# Patient Record
Sex: MALE | Race: WHITE | NOT HISPANIC OR LATINO | URBAN - METROPOLITAN AREA
[De-identification: names, ages, dates, MRNs, and addresses within clinical notes are randomized per-mention and may not be internally consistent; named-entity substitution may affect disease eponyms.]

---

## 2018-04-17 ENCOUNTER — EMERGENCY (EMERGENCY)
Facility: HOSPITAL | Age: 63
LOS: 1 days | Discharge: HOME | End: 2018-04-17
Attending: EMERGENCY MEDICINE | Admitting: EMERGENCY MEDICINE
Payer: COMMERCIAL

## 2018-04-17 VITALS
RESPIRATION RATE: 16 BRPM | WEIGHT: 179.9 LBS | OXYGEN SATURATION: 97 % | TEMPERATURE: 98 F | HEIGHT: 69 IN | SYSTOLIC BLOOD PRESSURE: 157 MMHG | HEART RATE: 84 BPM | DIASTOLIC BLOOD PRESSURE: 94 MMHG

## 2018-04-17 PROCEDURE — 99284 EMERGENCY DEPT VISIT MOD MDM: CPT

## 2018-04-17 PROCEDURE — 99284 EMERGENCY DEPT VISIT MOD MDM: CPT | Mod: 25

## 2018-04-17 PROCEDURE — 73562 X-RAY EXAM OF KNEE 3: CPT

## 2018-04-17 PROCEDURE — 73562 X-RAY EXAM OF KNEE 3: CPT | Mod: 26,RT

## 2018-04-17 RX ORDER — ATORVASTATIN CALCIUM 80 MG/1
1 TABLET, FILM COATED ORAL
Qty: 0 | Refills: 0 | COMMUNITY

## 2018-04-17 RX ORDER — IBUPROFEN 200 MG
600 TABLET ORAL ONCE
Qty: 0 | Refills: 0 | Status: COMPLETED | OUTPATIENT
Start: 2018-04-17 | End: 2018-04-17

## 2018-04-17 RX ORDER — MULTIVIT-MIN/FERROUS GLUCONATE 9 MG/15 ML
1 LIQUID (ML) ORAL
Qty: 0 | Refills: 0 | COMMUNITY

## 2018-04-17 RX ORDER — LOSARTAN/HYDROCHLOROTHIAZIDE 100MG-25MG
0 TABLET ORAL
Qty: 0 | Refills: 0 | COMMUNITY

## 2018-04-17 RX ORDER — ASPIRIN/CALCIUM CARB/MAGNESIUM 324 MG
1 TABLET ORAL
Qty: 0 | Refills: 0 | COMMUNITY

## 2018-04-17 RX ADMIN — Medication 600 MILLIGRAM(S): at 20:56

## 2018-04-17 NOTE — ED PROVIDER NOTE - PHYSICAL EXAMINATION
MS RLE:+ INFERIOR LATERAL THIGH TTP WITHOUT CREPITUS, DEFORMITY, SWELLING. FROM OF R LE. SENSATION GROSSLY INTACT.   PV:+ 2 DP BL LE.

## 2018-04-17 NOTE — ED PROCEDURE NOTE - CPROC ED POST PROC CARE GUIDE1
Verbal/written post procedure instructions were given to patient/caregiver./Instructed patient/caregiver regarding signs and symptoms of infection./Instructed patient/caregiver to follow-up with primary care physician./Elevate the injured extremity as instructed./Keep the cast/splint/dressing clean and dry.

## 2018-04-17 NOTE — CONSULT NOTE ADULT - SUBJECTIVE AND OBJECTIVE BOX
male slipped and fell and noted a sharp pain at the distal lateral thigh. denies any patella injury or any injury in any other area    PE: mild swelling dorsolateral right thigh. point tenderness at the junction of the vastus lateralis and quad tendon   strength right quad -5/5; H/S 5/5. full ROM of the knee actively  compartments completely soft  no instability of the knee with varus/valgus testing. negative ant drawer/lachman  no patella subluxation. no knee joint tenderness    xray: right knee with calcification lateral femoral condyle, no acute changes      Plan:   1) MRI right thigh to r/o quad muscle tear  2) HKB open  3) cane  4) RICE  5) antiinflammatory meds PRN   6) rest   7) follow in office 1 week   8) WBAT

## 2018-04-17 NOTE — ED ADULT NURSE NOTE - OBJECTIVE STATEMENT
patient has c/o right thigh pain after walking down to stairs, denies trauma and skin intact, no bruises or bleeding, no hx of dvt, no fever or chills, PA at bedside, will continue to monitor.

## 2018-04-17 NOTE — ED PROVIDER NOTE - ATTENDING CONTRIBUTION TO CARE
hx as per pt and PA,, 61yo male with right thigh pain after missing a step today. pt c/o diffuse right thigh pain, non radiating no tingling or numnbess  exam:+tenderness to lateral thigh, neg ant/post drawer, no click to knee, neurovasc intact  plan:xr, ortho follow up  agree with assessment and plan of pa

## 2018-04-17 NOTE — ED ADULT NURSE NOTE - ADDITIONAL PRINTED INSTRUCTIONS GIVEN
Patient reviewed discharge instruction and medication with RN, follow up information given, Patient understood and ambulated to dc.

## 2018-04-17 NOTE — ED PROVIDER NOTE - OBJECTIVE STATEMENT
pt is a 61yo male with pmhx of hld and htn c/o R le pain x today. pt reports he was walking down the steps and stepped wrong off the edge of the step, almost falling, causing r thigh pain. pt reports he is able to ambulate but has pain. pt took tylenol for symptoms which provided minimal relief. pt reports dr daugherty his ortho is coming to ed to evaluate him. pt denies fever, cp, sob, numbness or tingling of extremity, loc, head injury.

## 2020-08-07 NOTE — ED ADULT TRIAGE NOTE - RESPIRATORY RATE (BREATHS/MIN)
Get plenty of fluids and rest.  As we discussed, signs, physical exam, imaging and lab studies all appear very well today.  However, you appear to be  A low risk to the slightly moderate risk of having major adverse cardiac event in the next 6 weeks.  Therefore, an outpatient echocardiogram referral was placed for you as well as follow-up with PCP for reassessment and to address those findings.  If you continue to have increased discomfort please return for further evaluation.   16
